# Patient Record
Sex: MALE | URBAN - METROPOLITAN AREA
[De-identification: names, ages, dates, MRNs, and addresses within clinical notes are randomized per-mention and may not be internally consistent; named-entity substitution may affect disease eponyms.]

---

## 2022-07-29 ENCOUNTER — OFFICE VISIT (OUTPATIENT)
Dept: URBAN - METROPOLITAN AREA TELEHEALTH 2 | Facility: TELEHEALTH | Age: 37
End: 2022-07-29

## 2022-07-29 VITALS — BODY MASS INDEX: 22.9 KG/M2 | WEIGHT: 160 LBS | HEIGHT: 70 IN

## 2022-07-29 RX ORDER — FOLIC ACID 1 MG/1
TABLET ORAL
Qty: 30 TABLET | Status: ACTIVE | COMMUNITY

## 2022-07-29 RX ORDER — ADALIMUMAB 80MG/0.8ML
KIT SUBCUTANEOUS
Qty: 3 EACH | Status: ACTIVE | COMMUNITY

## 2022-07-29 RX ORDER — OXYCODONE HYDROCHLORIDE 30 MG/1
TAKE 1 OR 2 TABLETS EVERY 4 HOURS AS NEEDED TABLET ORAL
Qty: 180 EACH | Refills: 0 | Status: ACTIVE | COMMUNITY

## 2022-07-29 RX ORDER — METHADONE HYDROCHLORIDE 10 MG/1
TAKE THREE TABLETS 3 TIMES A DAY TABLET ORAL
Qty: 270 EACH | Refills: 0 | Status: ACTIVE | COMMUNITY

## 2022-07-29 RX ORDER — PENTOXIFYLLINE 400 MG/1
TABLET, EXTENDED RELEASE ORAL
Qty: 60 TABLET | Status: ACTIVE | COMMUNITY

## 2022-07-29 RX ORDER — AMITRIPTYLINE HYDROCHLORIDE 25 MG/1
TAKE 1 TABLET BY MOUTH AT BEDTIME TABLET, FILM COATED ORAL
Qty: 30 EACH | Refills: 3 | Status: ACTIVE | COMMUNITY

## 2022-07-29 RX ORDER — CYCLOSPORINE 50 MG/1
TAKE 3 CAPSULES BY MOUTH TWICE A DAY CAPSULE, LIQUID FILLED ORAL
Qty: 90 EACH | Refills: 0 | Status: ACTIVE | COMMUNITY

## 2022-07-29 RX ORDER — TRAMADOL HYDROCHLORIDE 50 MG/1
TAKE 1 TABLET BY MOUTH EVERY 6 HOURS AS NEEDED TABLET, FILM COATED ORAL
Qty: 120 EACH | Refills: 3 | Status: ACTIVE | COMMUNITY

## 2022-07-29 RX ORDER — GABAPENTIN 400 MG/1
TAKE 1 CAPSULE AT BEDTIME FOR 14 DAYS, 1 CAPSULE TWICE DAILY FOR 14 DAYS, THEN 1 CAPSULE 3 TIMES DAILY CAPSULE ORAL
Qty: 90 EACH | Refills: 0 | Status: ACTIVE | COMMUNITY

## 2022-08-10 ENCOUNTER — TELEPHONE ENCOUNTER (OUTPATIENT)
Dept: URBAN - METROPOLITAN AREA CLINIC 92 | Facility: CLINIC | Age: 37
End: 2022-08-10

## 2022-08-11 ENCOUNTER — OFFICE VISIT (OUTPATIENT)
Dept: URBAN - METROPOLITAN AREA TELEHEALTH 2 | Facility: TELEHEALTH | Age: 37
End: 2022-08-11
Payer: COMMERCIAL

## 2022-08-11 ENCOUNTER — TELEPHONE ENCOUNTER (OUTPATIENT)
Dept: URBAN - METROPOLITAN AREA CLINIC 92 | Facility: CLINIC | Age: 37
End: 2022-08-11

## 2022-08-11 VITALS — WEIGHT: 160 LBS | HEIGHT: 70 IN | BODY MASS INDEX: 22.9 KG/M2

## 2022-08-11 DIAGNOSIS — R10.13 DYSPEPSIA: ICD-10-CM

## 2022-08-11 DIAGNOSIS — D57.1 SICKLE CELL DISEASE: ICD-10-CM

## 2022-08-11 DIAGNOSIS — L88 PYODERMA GANGRENOSUM: ICD-10-CM

## 2022-08-11 DIAGNOSIS — D50.9 IDA (IRON DEFICIENCY ANEMIA): ICD-10-CM

## 2022-08-11 PROCEDURE — 99204 OFFICE O/P NEW MOD 45 MIN: CPT | Performed by: INTERNAL MEDICINE

## 2022-08-11 PROCEDURE — 99244 OFF/OP CNSLTJ NEW/EST MOD 40: CPT | Performed by: INTERNAL MEDICINE

## 2022-08-11 RX ORDER — TRAMADOL HYDROCHLORIDE 50 MG/1
TAKE 1 TABLET BY MOUTH EVERY 6 HOURS AS NEEDED TABLET, FILM COATED ORAL
Qty: 120 EACH | Refills: 3 | COMMUNITY

## 2022-08-11 RX ORDER — METHADONE HYDROCHLORIDE 10 MG/1
TAKE THREE TABLETS 3 TIMES A DAY TABLET ORAL
Qty: 270 EACH | Refills: 0 | COMMUNITY

## 2022-08-11 RX ORDER — CYCLOSPORINE 50 MG/1
TAKE 3 CAPSULES BY MOUTH TWICE A DAY CAPSULE, LIQUID FILLED ORAL
Qty: 90 EACH | Refills: 0 | COMMUNITY

## 2022-08-11 RX ORDER — ADALIMUMAB 80MG/0.8ML
KIT SUBCUTANEOUS
Qty: 3 EACH | COMMUNITY

## 2022-08-11 RX ORDER — GABAPENTIN 400 MG/1
TAKE 1 CAPSULE AT BEDTIME FOR 14 DAYS, 1 CAPSULE TWICE DAILY FOR 14 DAYS, THEN 1 CAPSULE 3 TIMES DAILY CAPSULE ORAL
Qty: 90 EACH | Refills: 0 | COMMUNITY

## 2022-08-11 RX ORDER — OXYCODONE HYDROCHLORIDE 30 MG/1
TAKE 1 OR 2 TABLETS EVERY 4 HOURS AS NEEDED TABLET ORAL
Qty: 180 EACH | Refills: 0 | COMMUNITY

## 2022-08-11 RX ORDER — AMITRIPTYLINE HYDROCHLORIDE 25 MG/1
TAKE 1 TABLET BY MOUTH AT BEDTIME TABLET, FILM COATED ORAL
Qty: 30 EACH | Refills: 3 | COMMUNITY

## 2022-08-11 RX ORDER — PENTOXIFYLLINE 400 MG/1
TABLET, EXTENDED RELEASE ORAL
Qty: 60 TABLET | COMMUNITY

## 2022-08-11 RX ORDER — FOLIC ACID 1 MG/1
TABLET ORAL
Qty: 30 TABLET | COMMUNITY

## 2022-08-11 NOTE — HPI-TODAY'S VISIT:
Patient was referred by Dr. Marty Roberson for an evaluation of anemia.  A copy of this note will be sent to the referring provider  rare intermittent dyspepsia   Denies abd pain N/V diarrhea constipation hematochezia melena jaundice unintentional wt loss   Denies htbrn dysphagia odynophagia food impaction CP cough anorexia light headedness   Denies scleral icterus, increased abd girth, LE edema, confusion, disorientation, memory loss, hematemesis  NSAID usage: ibuprofen prn  EtOH / Tob: none  Fam Hx GI cancer: none  no Prior EGD/colon, CT scan, RUQ US

## 2022-09-09 ENCOUNTER — OFFICE VISIT (OUTPATIENT)
Dept: URBAN - METROPOLITAN AREA SURGERY CENTER 16 | Facility: SURGERY CENTER | Age: 37
End: 2022-09-09
Payer: COMMERCIAL

## 2022-09-09 ENCOUNTER — TELEPHONE ENCOUNTER (OUTPATIENT)
Dept: URBAN - METROPOLITAN AREA CLINIC 92 | Facility: CLINIC | Age: 37
End: 2022-09-09

## 2022-09-09 DIAGNOSIS — D50.9 IDA (IRON DEFICIENCY ANEMIA): ICD-10-CM

## 2022-09-09 PROCEDURE — 43239 EGD BIOPSY SINGLE/MULTIPLE: CPT | Performed by: INTERNAL MEDICINE

## 2022-09-09 PROCEDURE — 45378 DIAGNOSTIC COLONOSCOPY: CPT | Performed by: INTERNAL MEDICINE

## 2022-09-09 PROCEDURE — G8907 PT DOC NO EVENTS ON DISCHARG: HCPCS | Performed by: INTERNAL MEDICINE

## 2022-09-09 RX ORDER — TRAMADOL HYDROCHLORIDE 50 MG/1
TAKE 1 TABLET BY MOUTH EVERY 6 HOURS AS NEEDED TABLET, FILM COATED ORAL
Qty: 120 EACH | Refills: 3 | COMMUNITY

## 2022-09-09 RX ORDER — OXYCODONE HYDROCHLORIDE 30 MG/1
TAKE 1 OR 2 TABLETS EVERY 4 HOURS AS NEEDED TABLET ORAL
Qty: 180 EACH | Refills: 0 | COMMUNITY

## 2022-09-09 RX ORDER — AMITRIPTYLINE HYDROCHLORIDE 25 MG/1
TAKE 1 TABLET BY MOUTH AT BEDTIME TABLET, FILM COATED ORAL
Qty: 30 EACH | Refills: 3 | COMMUNITY

## 2022-09-09 RX ORDER — GABAPENTIN 400 MG/1
TAKE 1 CAPSULE AT BEDTIME FOR 14 DAYS, 1 CAPSULE TWICE DAILY FOR 14 DAYS, THEN 1 CAPSULE 3 TIMES DAILY CAPSULE ORAL
Qty: 90 EACH | Refills: 0 | COMMUNITY

## 2022-09-09 RX ORDER — METHADONE HYDROCHLORIDE 10 MG/1
TAKE THREE TABLETS 3 TIMES A DAY TABLET ORAL
Qty: 270 EACH | Refills: 0 | COMMUNITY

## 2022-09-09 RX ORDER — PENTOXIFYLLINE 400 MG/1
TABLET, EXTENDED RELEASE ORAL
Qty: 60 TABLET | COMMUNITY

## 2022-09-09 RX ORDER — CYCLOSPORINE 50 MG/1
TAKE 3 CAPSULES BY MOUTH TWICE A DAY CAPSULE, LIQUID FILLED ORAL
Qty: 90 EACH | Refills: 0 | COMMUNITY

## 2022-09-09 RX ORDER — FOLIC ACID 1 MG/1
TABLET ORAL
Qty: 30 TABLET | COMMUNITY

## 2022-09-09 RX ORDER — ADALIMUMAB 80MG/0.8ML
KIT SUBCUTANEOUS
Qty: 3 EACH | COMMUNITY

## 2022-09-22 ENCOUNTER — TELEPHONE ENCOUNTER (OUTPATIENT)
Dept: URBAN - METROPOLITAN AREA CLINIC 92 | Facility: CLINIC | Age: 37
End: 2022-09-22

## 2022-12-10 ENCOUNTER — TELEPHONE ENCOUNTER (OUTPATIENT)
Dept: URBAN - METROPOLITAN AREA CLINIC 92 | Facility: CLINIC | Age: 37
End: 2022-12-10

## 2022-12-10 PROBLEM — 417357006 SICKLE CELL DISEASE: Status: ACTIVE | Noted: 2022-08-06

## 2022-12-15 ENCOUNTER — OFFICE VISIT (OUTPATIENT)
Dept: URBAN - METROPOLITAN AREA TELEHEALTH 2 | Facility: TELEHEALTH | Age: 37
End: 2022-12-15
Payer: COMMERCIAL

## 2022-12-15 ENCOUNTER — DASHBOARD ENCOUNTERS (OUTPATIENT)
Age: 37
End: 2022-12-15

## 2022-12-15 VITALS — WEIGHT: 160 LBS | BODY MASS INDEX: 22.9 KG/M2 | HEIGHT: 70 IN

## 2022-12-15 DIAGNOSIS — L88 PYODERMA GANGRENOSUM: ICD-10-CM

## 2022-12-15 DIAGNOSIS — D57.1 SICKLE CELL DISEASE: ICD-10-CM

## 2022-12-15 DIAGNOSIS — D50.8 ACQUIRED IRON DEFICIENCY ANEMIA DUE TO DECREASED ABSORPTION: ICD-10-CM

## 2022-12-15 DIAGNOSIS — R10.13 DYSPEPSIA: ICD-10-CM

## 2022-12-15 PROBLEM — 87522002 IRON DEFICIENCY ANEMIA: Status: ACTIVE | Noted: 2022-08-11

## 2022-12-15 PROCEDURE — 99214 OFFICE O/P EST MOD 30 MIN: CPT | Performed by: INTERNAL MEDICINE

## 2022-12-15 RX ORDER — OXYCODONE HYDROCHLORIDE 30 MG/1
TAKE 1 OR 2 TABLETS EVERY 4 HOURS AS NEEDED TABLET ORAL
Qty: 180 EACH | Refills: 0 | Status: ACTIVE | COMMUNITY

## 2022-12-15 RX ORDER — TRAMADOL HYDROCHLORIDE 50 MG/1
TAKE 1 TABLET BY MOUTH EVERY 6 HOURS AS NEEDED TABLET, FILM COATED ORAL
Qty: 120 EACH | Refills: 3 | Status: ACTIVE | COMMUNITY

## 2022-12-15 RX ORDER — ADALIMUMAB 80MG/0.8ML
KIT SUBCUTANEOUS
Qty: 3 EACH | Status: ACTIVE | COMMUNITY

## 2022-12-15 RX ORDER — GABAPENTIN 400 MG/1
TAKE 1 CAPSULE AT BEDTIME FOR 14 DAYS, 1 CAPSULE TWICE DAILY FOR 14 DAYS, THEN 1 CAPSULE 3 TIMES DAILY CAPSULE ORAL
Qty: 90 EACH | Refills: 0 | Status: ACTIVE | COMMUNITY

## 2022-12-15 RX ORDER — CYCLOSPORINE 50 MG/1
TAKE 3 CAPSULES BY MOUTH TWICE A DAY CAPSULE, LIQUID FILLED ORAL
Qty: 90 EACH | Refills: 0 | Status: ACTIVE | COMMUNITY

## 2022-12-15 RX ORDER — METHADONE HYDROCHLORIDE 10 MG/1
TAKE THREE TABLETS 3 TIMES A DAY TABLET ORAL
Qty: 270 EACH | Refills: 0 | Status: ACTIVE | COMMUNITY

## 2022-12-15 RX ORDER — FOLIC ACID 1 MG/1
TABLET ORAL
Qty: 30 TABLET | Status: ACTIVE | COMMUNITY

## 2022-12-15 RX ORDER — PENTOXIFYLLINE 400 MG/1
TABLET, EXTENDED RELEASE ORAL
Qty: 60 TABLET | Status: ACTIVE | COMMUNITY

## 2022-12-15 RX ORDER — AMITRIPTYLINE HYDROCHLORIDE 25 MG/1
TAKE 1 TABLET BY MOUTH AT BEDTIME TABLET, FILM COATED ORAL
Qty: 30 EACH | Refills: 3 | Status: ACTIVE | COMMUNITY

## 2022-12-15 NOTE — HPI-TODAY'S VISIT:
wt stable# over 4mo (was 160)  9/9/22 EGD reflux esophagitis nonhpylori gastritis COLON WNL  rare intermittent dyspepsia   Denies abd pain N/V diarrhea constipation hematochezia melena jaundice unintentional wt loss   Denies htbrn dysphagia odynophagia food impaction CP cough anorexia light headedness   Denies scleral icterus, increased abd girth, LE edema, confusion, disorientation, memory loss, hematemesis  NSAID usage: ibuprofen prn  EtOH / Tob: none  Fam Hx GI cancer: none

## 2022-12-27 ENCOUNTER — TELEPHONE ENCOUNTER (OUTPATIENT)
Dept: URBAN - METROPOLITAN AREA CLINIC 92 | Facility: CLINIC | Age: 37
End: 2022-12-27

## 2022-12-28 ENCOUNTER — OFFICE VISIT (OUTPATIENT)
Dept: URBAN - METROPOLITAN AREA CLINIC 91 | Facility: CLINIC | Age: 37
End: 2022-12-28

## 2023-01-19 ENCOUNTER — TELEPHONE ENCOUNTER (OUTPATIENT)
Dept: URBAN - METROPOLITAN AREA CLINIC 92 | Facility: CLINIC | Age: 38
End: 2023-01-19

## 2023-01-20 ENCOUNTER — OFFICE VISIT (OUTPATIENT)
Dept: URBAN - METROPOLITAN AREA CLINIC 91 | Facility: CLINIC | Age: 38
End: 2023-01-20

## 2023-02-06 ENCOUNTER — OFFICE VISIT (OUTPATIENT)
Dept: URBAN - METROPOLITAN AREA CLINIC 91 | Facility: CLINIC | Age: 38
End: 2023-02-06

## 2023-02-15 ENCOUNTER — OFFICE VISIT (OUTPATIENT)
Dept: URBAN - METROPOLITAN AREA CLINIC 91 | Facility: CLINIC | Age: 38
End: 2023-02-15

## 2024-07-01 ENCOUNTER — OFFICE VISIT (OUTPATIENT)
Dept: URBAN - METROPOLITAN AREA CLINIC 17 | Facility: CLINIC | Age: 39
End: 2024-07-01